# Patient Record
Sex: FEMALE | Race: WHITE | URBAN - METROPOLITAN AREA
[De-identification: names, ages, dates, MRNs, and addresses within clinical notes are randomized per-mention and may not be internally consistent; named-entity substitution may affect disease eponyms.]

---

## 2017-04-13 ENCOUNTER — IMPORTED ENCOUNTER (OUTPATIENT)
Dept: URBAN - METROPOLITAN AREA CLINIC 50 | Facility: CLINIC | Age: 57
End: 2017-04-13

## 2017-05-12 ENCOUNTER — IMPORTED ENCOUNTER (OUTPATIENT)
Dept: URBAN - METROPOLITAN AREA CLINIC 50 | Facility: CLINIC | Age: 57
End: 2017-05-12

## 2019-01-11 ENCOUNTER — IMPORTED ENCOUNTER (OUTPATIENT)
Dept: URBAN - METROPOLITAN AREA CLINIC 50 | Facility: CLINIC | Age: 59
End: 2019-01-11

## 2019-01-11 NOTE — PATIENT DISCUSSION
"""Retinoschishs inf/temp OU. Patient asymptomatic.  Edu patient on si/sx's of RD and to rtc STAT ""

## 2019-01-11 NOTE — PATIENT DISCUSSION
"""Call if vision decreases or RD warning signs increases/RD warnings given.  No signs of left eye ""

## 2019-01-11 NOTE — PATIENT DISCUSSION
"""Patient given finalized contact lens prescription.  Instructed to remove lenses and call the office if any pain

## 2020-12-22 ENCOUNTER — IMPORTED ENCOUNTER (OUTPATIENT)
Dept: URBAN - METROPOLITAN AREA CLINIC 50 | Facility: CLINIC | Age: 60
End: 2020-12-22

## 2020-12-22 NOTE — PATIENT DISCUSSION
"""Retinoschishs inf/temp OU. Patient asymptomatic.  Edu patient on si/sx's of RD and to rtc STAT if ""

## 2021-01-05 ENCOUNTER — IMPORTED ENCOUNTER (OUTPATIENT)
Dept: URBAN - METROPOLITAN AREA CLINIC 50 | Facility: CLINIC | Age: 61
End: 2021-01-05

## 2021-01-05 NOTE — PATIENT DISCUSSION
"""Patient given final contact lens prescription. 1/5/2021.  Patient given finalized contact lens ""

## 2021-04-18 ASSESSMENT — VISUAL ACUITY
OD_CC: 20/25-2
OS_CC: J1
OD_CC: 20/40+
OS_PH: 20/25-1
OD_CC: J1@ 16 IN
OD_CC: 20/25-2
OD_CC: J1NEAR
OS_OTHER: 20/20. 20/30.
OS_PH: 20/30-2
OS_BAT: 20/20
OS_CC: J1@ 16 IN
OS_CC: 20/200
OD_CC: J1
OD_OTHER: 20/30. 20/40.
OS_CC: 20/50
OS_CC: J1NEAR
OD_BAT: 20/30
OS_CC: 20/30+

## 2021-04-18 ASSESSMENT — TONOMETRY
OS_IOP_MMHG: 15
OS_IOP_MMHG: 19
OS_IOP_MMHG: 17
OD_IOP_MMHG: 15
OD_IOP_MMHG: 17
OD_IOP_MMHG: 19

## 2023-01-12 ENCOUNTER — COMPREHENSIVE EXAM (OUTPATIENT)
Dept: URBAN - METROPOLITAN AREA CLINIC 53 | Facility: CLINIC | Age: 63
End: 2023-01-12

## 2023-01-12 DIAGNOSIS — Z01.01: ICD-10-CM

## 2023-01-12 DIAGNOSIS — H16.223: ICD-10-CM

## 2023-01-12 DIAGNOSIS — H52.4: ICD-10-CM

## 2023-01-12 DIAGNOSIS — Z97.3: ICD-10-CM

## 2023-01-12 DIAGNOSIS — H25.13: ICD-10-CM

## 2023-01-12 PROCEDURE — 92014 COMPRE OPH EXAM EST PT 1/>: CPT

## 2023-01-12 PROCEDURE — 92310-3E ESTABLISHED CL PATIENT MULTIFOCAL AND/OR MONOVISION SOFT LENS EVALUATION

## 2023-01-12 PROCEDURE — 92015 DETERMINE REFRACTIVE STATE: CPT

## 2023-01-12 ASSESSMENT — KERATOMETRY
OS_K1POWER_DIOPTERS: 45.75
OS_AXISANGLE_DEGREES: 172
OS_K2POWER_DIOPTERS: 47.00
OS_AXISANGLE2_DEGREES: 82
OD_AXISANGLE_DEGREES: 164
OD_K1POWER_DIOPTERS: 43.75
OD_K2POWER_DIOPTERS: 44.25
OD_AXISANGLE2_DEGREES: 74

## 2023-01-12 ASSESSMENT — VISUAL ACUITY
OD_CC: 20/30
OU_CC: J1
OD_GLARE: 20/50
OS_CC: 20/100
OS_CC: 20/20
OD_CC: 20/30
OU_CC: J2
OD_GLARE: 20/30
OS_GLARE: 20/40
OU_CC: 20/20
OS_GLARE: 20/30
OU_CC: 20/25

## 2023-01-12 ASSESSMENT — TONOMETRY
OD_IOP_MMHG: 16
OS_IOP_MMHG: 15

## 2025-03-20 ENCOUNTER — COMPREHENSIVE EXAM (OUTPATIENT)
Age: 65
End: 2025-03-20

## 2025-03-20 DIAGNOSIS — H25.13: ICD-10-CM

## 2025-03-20 DIAGNOSIS — H52.4: ICD-10-CM

## 2025-03-20 DIAGNOSIS — H43.812: ICD-10-CM

## 2025-03-20 DIAGNOSIS — H16.223: ICD-10-CM

## 2025-03-20 DIAGNOSIS — Z97.3: ICD-10-CM

## 2025-03-20 PROCEDURE — 92310-1 LEVEL 1 SOFT LENS UPDATE

## 2025-03-20 PROCEDURE — 92015 DETERMINE REFRACTIVE STATE: CPT

## 2025-03-20 PROCEDURE — 99213 OFFICE O/P EST LOW 20 MIN: CPT
